# Patient Record
Sex: MALE | URBAN - NONMETROPOLITAN AREA
[De-identification: names, ages, dates, MRNs, and addresses within clinical notes are randomized per-mention and may not be internally consistent; named-entity substitution may affect disease eponyms.]

---

## 2019-04-19 ENCOUNTER — OFFICE VISIT (OUTPATIENT)
Dept: URBAN - NONMETROPOLITAN AREA CLINIC 18 | Facility: CLINIC | Age: 39
End: 2019-04-19
Payer: COMMERCIAL

## 2019-04-19 DIAGNOSIS — H33.321 ROUND HOLE, RIGHT EYE: ICD-10-CM

## 2019-04-19 DIAGNOSIS — H40.013 OPEN ANGLE WITH BORDERLINE FINDINGS, LOW RISK, BILATERAL: ICD-10-CM

## 2019-04-19 DIAGNOSIS — H10.89 OTHER CONJUNCTIVITIS: Primary | ICD-10-CM

## 2019-04-19 PROCEDURE — 99203 OFFICE O/P NEW LOW 30 MIN: CPT | Performed by: OPTOMETRIST

## 2019-04-19 RX ORDER — OFLOXACIN 3 MG/ML
0.3 % SOLUTION/ DROPS OPHTHALMIC
Qty: 1 | Refills: 0 | Status: ACTIVE
Start: 2019-04-19

## 2019-04-19 ASSESSMENT — INTRAOCULAR PRESSURE
OS: 16
OD: 14

## 2019-04-19 NOTE — IMPRESSION/PLAN
Impression: Other conjunctivitis: H10.89. Plan: Discussed condition with patient and hand hygiene. D/C Poly/Erich. Start Ofloxacin QID OU (potential side effects discussed). RTC if increased pain, redness or changes in vision experienced.

## 2019-04-19 NOTE — IMPRESSION/PLAN
Impression: Open angle with borderline findings, low risk, bilateral: H40.013. Plan: Education provided, suspect based on ON appearance. Average IOP. Will monitor. Baseline testing after appt with Dr. Alejandra Garcia.

## 2019-04-19 NOTE — IMPRESSION/PLAN
Impression: Round hole, right eye: H33.321. Plan: Discussed condition with patient. Patient reports no trauma and no symptoms. . Retinal hole with traction right eye. Will refer to Dr. Josesito Dahl for evaluation and treatment in 1-2 weeks. All signs/symptoms and risks of retinal detachment and tears were discussed in detail. Patient instructed to call office immediately if any symptoms noted.